# Patient Record
Sex: MALE | Race: WHITE
[De-identification: names, ages, dates, MRNs, and addresses within clinical notes are randomized per-mention and may not be internally consistent; named-entity substitution may affect disease eponyms.]

---

## 2018-03-29 ENCOUNTER — HOSPITAL ENCOUNTER (EMERGENCY)
Dept: HOSPITAL 46 - ED | Age: 83
Discharge: ANOTHER HOSPITAL | End: 2018-03-29
Payer: MEDICARE

## 2018-03-29 VITALS — HEIGHT: 67 IN | BODY MASS INDEX: 25.9 KG/M2 | WEIGHT: 164.99 LBS

## 2018-03-29 DIAGNOSIS — I12.0: ICD-10-CM

## 2018-03-29 DIAGNOSIS — Z87.891: ICD-10-CM

## 2018-03-29 DIAGNOSIS — N18.4: ICD-10-CM

## 2018-03-29 DIAGNOSIS — A41.9: Primary | ICD-10-CM

## 2018-03-29 DIAGNOSIS — Z79.899: ICD-10-CM

## 2018-03-29 DIAGNOSIS — Z88.8: ICD-10-CM

## 2018-03-31 NOTE — EKG
[facility]
                                    [address]
                                  [place]  [address]
_________________________________________________________________________________________
                                                                 Signed   
 
 
Sinus rhythm with premature atrial complexes
ST \T\ T wave abnormality, consider lateral ischemia Prolonged QT Abnormal ECG When
compared with ECG of 28-JAN-2017 13:34,
premature atrial complexes are now present
ST now depressed in Lateral leads
Nonspecific T wave abnormality has replaced inverted T waves in Inferior leads
T wave inversion now evident in Lateral leads
Confirmed by AUGUSTIN DOTSON MD ([id number]) on 3/31/2018 10:52:06 AM
 
 
 
 
 
 
 
 
 
 
 
 
 
 
 
 
 
 
 
 
 
 
 
 
 
 
 
 
 
 
 
 
 
 
 
 
 
    Electronically Signed By: AUGUSTIN DOTSON MD  03/31/18 1052
_________________________________________________________________________________________
PATIENT NAME:     MADINA MEYER                  
MEDICAL RECORD #: [id number]                     Electrocardiogram             
          ACCT #: [id number]  
DATE OF BIRTH:   [birth year]                                       
PHYSICIAN:   AUGUSTIN DOTSON MD           REPORT #: [id number]
REPORT IS CONFIDENTIAL AND NOT TO BE RELEASED WITHOUT AUTHORIZATION